# Patient Record
Sex: FEMALE | Race: WHITE | Employment: OTHER | URBAN - METROPOLITAN AREA
[De-identification: names, ages, dates, MRNs, and addresses within clinical notes are randomized per-mention and may not be internally consistent; named-entity substitution may affect disease eponyms.]

---

## 2022-01-19 ENCOUNTER — VIRTUAL VISIT (OUTPATIENT)
Dept: CARDIOLOGY | Facility: CLINIC | Age: 82
End: 2022-01-19
Attending: GENETIC COUNSELOR, MS
Payer: COMMERCIAL

## 2022-01-19 DIAGNOSIS — Z84.81 FAMILY HISTORY OF GENE MUTATION: ICD-10-CM

## 2022-01-19 DIAGNOSIS — Z82.49 FAMILY HISTORY OF LONG QT SYNDROME: Primary | ICD-10-CM

## 2022-01-19 PROCEDURE — 96040 HC GENETIC COUNSELING, EACH 30 MINUTES: CPT | Mod: GT,95 | Performed by: GENETIC COUNSELOR, MS

## 2022-01-19 RX ORDER — SIMVASTATIN 10 MG
10 TABLET ORAL
COMMUNITY
Start: 2021-12-27

## 2022-01-19 RX ORDER — DEXAMETHASONE 4 MG/1
2 TABLET ORAL
COMMUNITY
Start: 2020-12-03

## 2022-01-19 RX ORDER — ALBUTEROL SULFATE 90 UG/1
2 AEROSOL, METERED RESPIRATORY (INHALATION)
COMMUNITY
Start: 2020-12-03

## 2022-01-19 RX ORDER — VERAPAMIL HYDROCHLORIDE 240 MG/1
1 CAPSULE, EXTENDED RELEASE ORAL DAILY
COMMUNITY
Start: 2021-10-13

## 2022-01-19 NOTE — PATIENT INSTRUCTIONS
Indication for Genetic Counseling:     Long QT syndrome (LQTS) is a genetic heart condition that can cause rapid or irregular heartbeat, fainting, cardiac arrest, and sudden cardiac death (SCD).  It is usually clinically diagnosed during an electrocardiogram (ECG) when a part of the heartbeat called the QT interval lasts longer than normal.  This reduces the heart's ability to beat regularly and efficiently and can lead to the symptoms described above.  When not caused by genetics, a long QT interval can be caused by certain medications or electrolyte imbalances.  When these triggers are eliminated, the QT interval typically returns to normal.  When the long QT interval is caused by genetics, it can be inherited in families.     Inheritance:   Humans have over 20,000 genes that instruct our bodies how to grow and function.  We have two copies of each gene because we inherit one from our mother and one from our father.  The condition in your family is inherited in an autosomal dominant (AD) pattern. Dominant means that only one copy of the gene needs to be broken (gene mutation or pathogenic variant).  Since most people with a dominant condition have one normal gene and one broken gene, the risk that other family members carry the same gene is increased.  Parents, siblings, and children have a 50% chance of inheriting the mutation.      Testing Options:   Genetic testing is performed to assess a panel of genes known to cause this condition.  The test reads through the DNA of these genes (sequencing) to look for spelling mistakes or mutations that could cause the condition.      Genetic testing previously performed in your family member identified a gene mutation.  Therefore, instead of looking at all genes associated with this condition, this genetic test will look for the specific mutation(s) found in your family member(s).  If the familial mutation(s) is detected, you are at increased risk to develop the condition  and should follow the recommended screening guidelines provided by a cardiologist.  If the mutation is not detected, you are not at increased risk to develop the condition based on your genetic results.      Although we predict everyone who carries a mutation is at increased risk for developing the condition, we cannot predict age of onset or severity of symptoms due to reduced penetrance and variable expressivity.    Logistics:   Genetic test involves test a sample of DNA, thru blood, saliva, or cheek cells.  The sample will be sent to a laboratory to extract the DNA and sequence the genes for mutations.  The laboratory will work with your insurance company to determine the out of pocket (OOP) cost and will notify you if the OOP cost is greater than $100.  Remember to ask the lab about financial assistance pricing and self pay options as well.  Sometimes those are much lower than insurance pricing.  When testing is initiated, results take about 2-4 weeks to return. I will contact you over the phone when results are available.     Genetic Information and Nondiscrimination Act:  The Genetic Information and Nondiscrimination Act of 2008 (KARLEY) is a federal law that protects individuals from genetic discrimination in health insurance and employment. Genetic discrimination is defined as the misuse of genetic information. This law does not address potential discrimination regarding life insurance or disability insurance.      This is especially relevant for at risk individuals who are considering presymptomatic testing.    Screening Recommendations:  Explained that clinical evaluation is recommended for all first degree relatives (parents, siblings, and children) of an affected individual regardless of decision to pursue genetic testing.  Clinical evaluation may include history, cardiac exam, EKG, event monitoring, and exercise stress testing.    Individuals who carry a gene for LQTS or are at increased risk for  developing LQTS should avoid medications that can increase risk of a cardiac event.    Resources:  Sudden Arrhythmia Death Syndromes Foundation - sads.org  Heart Rhythm Society - HRSonline.org  6connect Meds phone esperanza (for a list of medications to avoid with LQTS)    General   American Heart Association - americanheart.org  Genetics Home Reference - ghr.Nival.nih.gov  Genetic Information and Nondiscrimination Act - [employer]bitesp.org    Contact Information:  Myla Anglin MS  Licensed Genetic Counselor  [facility]  [facility]    Office:  [phone]  Appointments:  [phone]  Fax: [phone]  Email: [email or web address]

## 2022-01-19 NOTE — PROGRESS NOTES
Yue Parker  is being evaluated via a billable video visit.      How would you like to obtain your AVS? Mail a copy  For the video visit, send the invitation by: Text to cell phone: [phone] using pts daughters phone for todays visit  Will anyone else be joining your video visit? pts daughter Jenn     PROVIDER APPOINTMENT  Here is a copy of the progress note from your recent genetic counseling visit through the [facility] on Date: 2022.  Due to Covid-19 pandemic, this appointment was moved to a virtual visit.    PROGRESS NOTE:Yue was seen for genetic counseling due to the family history of long QT syndrome (LQTS).  I had the opportunity to talk with Yue and her daughter Jenn today to discuss the genetic component of LQTS and testing options available to her .     MEDICAL HISTORY:Yue reports that she has not had any recent cardiac issues.  She reports that when her children were young she had some episodes of racing heart rate.  At the time she was prescribed medication but she stopped taking it because it made her tired.  She denies any history of fainting.  Her history is remarkable for cancer.     FAMILY HISTORY:A detailed family history was obtained during granddaughter's appointment and updated today.  Family history was significant for the following cardiac history:    GrandauMelly kelly, was recently diagnosed with long QT syndrome after experiencing episodes of palpitations and lightheadedness.  She recently had genetic testing which revealed a mutation in the KCNQ1 gene.    Your son Roberto, Melly's father, was found to carry the KCNQ1 mutation and has a history of prolonged QT and palpitations    Another son and daughter both report rapid heart rate but have no diagnosis of LQTS or other arrhythmia.  Two sons in good health.     Brother  suddenly in his 60's. Three other siblings with no known heart issues      Maternal grandfather had a brother  who  suddenly at 8 years after sledding.    Maternal uncle had a granddaughter who  suddenly at 12 years of age after a swim meet.  Postmortem studies at [place] suggested Long QT syndrome.    Maternal aunt's daughter had two children who both  suddenly, the son after a wrestling match.      Your   at 75 years from leukemia.  He had a history of heart failure, as did his father but there was no history of arrhythmias.  There is no additional history of cardiomyopathy, arrhythmias, heart attacks, fainting, sudden cardiac death, genetic conditions, or birth defects. (A copy of pedigree may be found under media tab).     DISCUSSION: Reviewed diagnosis of long QT syndrome (LQTS) found in the family. Explained that gene mutations are found in about 70% of patients with LQTS.  Genetic testing was previously performed in her granddaughter and identified a mutation in the KCNQ1 gene    Mutations in the KCNQ1 gene are inherited in an autosomal dominant (AD) pattern. Reviewed AD inheritance. Explained that most AD cardiac mutations are inherited from a parent, therefore it is appropriate to offer genetic testing in parents,siblings, and children.  Each of those family members has a 50% risk of carrying the same gene. Explained variable expressivity and reduced penetrance which can help explain why a condition can be genetic even when there is no apparent family history.     Yue understands that if she  is found to carry a mutation, she will be at increased risk for developing the disease and each of her  children and siblings will have a 50% risk of carrying the mutation as well. If Yue does NOT carry the familial mutation, she will not be at increased risk for developing the disease.  However, her children would still be at risk for developing this form of LQTS because they could have got it from their father     Reviewed logistics, capabilities and limitations of genetic testing. DNA sample via  saliva or blood is collected and sent to testing lab for evaluation of selected genes. Turn around time for results of 2-4 weeks. Reviewed cost of testing thru commercial lab.  Explained that testing is free since they are within 90 days of original proband result.    Discussed pros and cons of genetic testing. Explained that results could significantly impact management and treatment decisions.  Reviewed possible issues associated with presymptomatic testing including genetic discrimination, current laws to prevent discrimination (ie. KARLEY), insurance issues, and emotional and psychosocial outcomes of testing.     Recommend clinical evaluation for all first degree relatives (parents, siblings, and children) of an affected individual regardless of decision to pursue genetic testing. Clinical evaluation needs to be performed on an ongoing basis and should include history, cardiac exam, EKG, Holter monitoring, and exercise treadmill.      All questions answered at this time.    PLAN:Yue elected to proceed with genetic testing.  Requisition and consent forms were completed and signed.  DNA will be collected via saliva sample and sent to [facility]. I will contact patient when results are available.    TOTAL TIME SPENT IN COUNSELIN minutes    Myla Anglin MS, [facility]  Licensed, Certified Genetic Counselor  [facility]

## 2022-01-19 NOTE — LETTER
1/19/2022      RE: Yue Parker  [address] [place] [id number]       Dear Colleague,    Thank you for the opportunity to participate in the care of your patient, Yue Parker, at the [facility]. Please see a copy of my visit note below.    Yue Parker  is being evaluated via a billable video visit.      How would you like to obtain your AVS? Mail a copy  For the video visit, send the invitation by: Text to cell phone: [phone] using pts daughters phone for todays visit  Will anyone else be joining your video visit? pts daughter Jenn     PROVIDER APPOINTMENT  Here is a copy of the progress note from your recent genetic counseling visit through the [facility] on Date: 1/19/2022.  Due to Covid-19 pandemic, this appointment was moved to a virtual visit.    PROGRESS NOTE:Yue was seen for genetic counseling due to the family history of long QT syndrome (LQTS).  I had the opportunity to talk with Yue and her daughter Jenn today to discuss the genetic component of LQTS and testing options available to her .     MEDICAL HISTORY:Yue reports that she has not had any recent cardiac issues.  She reports that when her children were young she had some episodes of racing heart rate.  At the time she was prescribed medication but she stopped taking it because it made her tired.  She denies any history of fainting.  Her history is remarkable for cancer.     FAMILY HISTORY:A detailed family history was obtained during granddaughter's appointment and updated today.  Family history was significant for the following cardiac history:    GrandauMelly kelly, was recently diagnosed with long QT syndrome after experiencing episodes of palpitations and lightheadedness.  She recently had genetic testing which revealed a mutation in the KCNQ1 gene.    Your son Melly Mejia's father,  was found to carry the KCNQ1 mutation and has a history of prolonged QT and palpitations    Another son and daughter both report rapid heart rate but have no diagnosis of LQTS or other arrhythmia.  Two sons in good health.     Brother  suddenly in his 60's. Three other siblings with no known heart issues      Maternal grandfather had a brother who  suddenly at 8 years after sledding.    Maternal uncle had a granddaughter who  suddenly at 12 years of age after a swim meet.  Postmortem studies at [place] suggested Long QT syndrome.    Maternal aunt's daughter had two children who both  suddenly, the son after a wrestling match.      Your   at 75 years from leukemia.  He had a history of heart failure, as did his father but there was no history of arrhythmias.  There is no additional history of cardiomyopathy, arrhythmias, heart attacks, fainting, sudden cardiac death, genetic conditions, or birth defects. (A copy of pedigree may be found under media tab).     DISCUSSION: Reviewed diagnosis of long QT syndrome (LQTS) found in the family. Explained that gene mutations are found in about 70% of patients with LQTS.  Genetic testing was previously performed in her granddaughter and identified a mutation in the KCNQ1 gene    Mutations in the KCNQ1 gene are inherited in an autosomal dominant (AD) pattern. Reviewed AD inheritance. Explained that most AD cardiac mutations are inherited from a parent, therefore it is appropriate to offer genetic testing in parents,siblings, and children.  Each of those family members has a 50% risk of carrying the same gene. Explained variable expressivity and reduced penetrance which can help explain why a condition can be genetic even when there is no apparent family history.     Yue understands that if she  is found to carry a mutation, she will be at increased risk for developing the disease and each of her  children and siblings will have a 50% risk of carrying  the mutation as well. If Yue does NOT carry the familial mutation, she will not be at increased risk for developing the disease.  However, her children would still be at risk for developing this form of LQTS because they could have got it from their father     Reviewed logistics, capabilities and limitations of genetic testing. DNA sample via saliva or blood is collected and sent to testing lab for evaluation of selected genes. Turn around time for results of 2-4 weeks. Reviewed cost of testing thru commercial lab.  Explained that testing is free since they are within 90 days of original proband result.    Discussed pros and cons of genetic testing. Explained that results could significantly impact management and treatment decisions.  Reviewed possible issues associated with presymptomatic testing including genetic discrimination, current laws to prevent discrimination (ie. KARLEY), insurance issues, and emotional and psychosocial outcomes of testing.     Recommend clinical evaluation for all first degree relatives (parents, siblings, and children) of an affected individual regardless of decision to pursue genetic testing. Clinical evaluation needs to be performed on an ongoing basis and should include history, cardiac exam, EKG, Holter monitoring, and exercise treadmill.      All questions answered at this time.    PLAN:Yue elected to proceed with genetic testing.  Requisition and consent forms were completed and signed.  DNA will be collected via saliva sample and [facility]. I will contact patient when results are available.    TOTAL TIME SPENT IN COUNSELIN minutes    Myla Anglin MS, [facility]  Licensed, Certified Genetic Counselor  [facility]

## 2022-01-19 NOTE — NURSING NOTE
Chief Complaint   Patient presents with     Consult     pt states also taking daily vitamin and calcium       Esme Patel, VF/CMA

## 2022-02-03 ENCOUNTER — TELEPHONE (OUTPATIENT)
Dept: CARDIOLOGY | Facility: CLINIC | Age: 82
End: 2022-02-03
Payer: COMMERCIAL

## 2022-02-03 NOTE — TELEPHONE ENCOUNTER
Spoke with Yeu today to review results of genetic testing. She underwent genetic testing on 2022 due to the family history of long QT syndrome (LQTS) and a known genetic mutation in her  granddaughter and son.  Genetic testing for the familial mutation was sent to [facility] and revealed that Yue does NOT carry a mutation in the KCNQ1 gene. This result was surprising given the diagnosis of LQTS in her maternal cousins' children who  suddenly after activity.  Vijaya confirmed results - [place] data was thoroughly reviewed and confirmed negative. To be extra thorough, the  compared the traces with the other family member s traces and everything looked clean within and between cases. He also confirmed the collection tube image we have matches for this case.  Yue states that her  and his family had no known history of arrhythmia, fainting, or dizziness.  She will ask one of her nieces if there is anything she is unaware of.  Discussed possible explanations for this including mild presentation in her , mosaicism in Yue or her , and the possibility of a different mutation in Yue's extended family.  Vijaya is going to confirm if there is still enough sample to run a full arrhythmia panel in Yue.  However, I think the yield is low since Yue has not had any symptoms other than a racing heart when her children were young.  Yue and I will talk again when we both have more information to determine a plan going forward.  A summary letter and copy of the results will be sent to patient. All questions answered at this time.   Myla Anglin MS, [facility]  Licensed, Certified Genetic Counselor  [facility]  [facility]

## 2023-01-12 ENCOUNTER — NURSE TRIAGE (OUTPATIENT)
Dept: NURSING | Facility: CLINIC | Age: 83
End: 2023-01-12

## 2023-01-13 NOTE — TELEPHONE ENCOUNTER
Spoke w/ daughter Jenn after getting pt's verbal consent.   Jenn says pt had been very fatigued for a couple weeks then last week felt better. But then began feeling worse again on 1/9. Only sx are fatigue and past 2 nights fever - may be longer but had no thermometer until last night. Tonight T 100.7 orally. Denies SOB, chest pain, cough, congestion, sore throat, rash. Able to walk, no confusion. Denies dysuria. Negative home covid test 1/11.  O2 Sat 95%. Advised see provider w/i 24 hrs. Jenn voiced understanding and agreement. Will have pt seen at primary clinic ([facility]) or  tomorrow.       Reason for Disposition    Fever present > 3 days (72 hours)     Possibly. Fever x 2 nights. Before that did not have thermometer. Fatigue x4 days    Additional Information    Negative: Shock suspected (e.g., cold/pale/clammy skin, too weak to stand, low BP, rapid pulse)    Negative: Difficult to awaken or acting confused (e.g., disoriented, slurred speech)    Negative: [1] Difficulty breathing AND [2] bluish lips, tongue or face    Negative: New-onset rash with multiple purple (or blood-colored) spots or dots    Negative: Sounds like a life-threatening emergency to the triager    Negative: Fever in a cancer patient who is currently (or recently) receiving chemotherapy or radiation therapy, or cancer patient who has metastatic or end-stage cancer and is receiving palliative care    Negative: Pregnant    Negative: Postpartum (from 0 to 6 weeks after delivery)    Negative: Fever onset within 24 hours of receiving vaccine    Negative: [1] Fever AND [2] within 14 days of COVID-19 Exposure    Negative: Other symptom is present, see that guideline (e.g., symptoms of cough, runny nose, sore throat, earache, abdominal pain, diarrhea, vomiting)    Negative: [1] Headache AND [2] stiff neck (can't touch chin to chest)    Negative: Difficulty breathing    Negative: IV Drug Use (IVDU)    Negative: [1] Drinking very little AND  [2] dehydration suspected (e.g., no urine > 12 hours, very dry mouth, very lightheaded)    Negative: Widespread rash and cause unknown    Negative: Patient sounds very sick or weak to the triager  (Exception: mild weakness and hasn't taken fever medicine)    Negative: Fever > 104 F (40 C)    Negative: [1] Fever > 101 F (38.3 C) AND [2] age > 60 years    Negative: [1] Fever > 100.0 F (37.8 C) AND [2] bedridden (e.g., nursing home patient, CVA, chronic illness, recovering from surgery)    Negative: [1] Fever > 100.0 F (37.8 C) AND [2] indwelling urinary catheter (e.g., [place], [place])    Negative: [1] Fever > 100.0 F (37.8 C) AND [2] has port (portacath), central line, or PICC line    Negative: [1] Fever > 100.0 F (37.8 C) AND [2] diabetes mellitus or weak immune system (e.g., HIV positive, cancer chemo, splenectomy, organ transplant, chronic steroids)    Negative: [1] Fever > 100.0 F (37.8 C) AND [2] surgery in the last month    Negative: Transplant patient (e.g., kidney, liver, lung, heart)    Negative: Severe chills (i.e., feeling extremely cold WITH shaking chills)    Protocols used: FEVER-A-